# Patient Record
Sex: FEMALE | Race: WHITE | NOT HISPANIC OR LATINO | Employment: OTHER | URBAN - METROPOLITAN AREA
[De-identification: names, ages, dates, MRNs, and addresses within clinical notes are randomized per-mention and may not be internally consistent; named-entity substitution may affect disease eponyms.]

---

## 2018-07-19 PROBLEM — G62.9 NEUROPATHY: Status: ACTIVE | Noted: 2018-07-19

## 2019-04-11 PROBLEM — M41.9 SCOLIOSIS: Status: ACTIVE | Noted: 2019-04-11

## 2019-04-11 PROBLEM — F51.01 PRIMARY INSOMNIA: Status: ACTIVE | Noted: 2019-04-11

## 2019-04-11 PROBLEM — E78.5 HYPERLIPIDEMIA: Status: ACTIVE | Noted: 2019-04-11

## 2021-01-18 PROBLEM — Z12.11 SCREENING FOR COLON CANCER: Status: ACTIVE | Noted: 2021-01-18

## 2021-04-29 ENCOUNTER — PATIENT MESSAGE (OUTPATIENT)
Dept: RESEARCH | Facility: HOSPITAL | Age: 66
End: 2021-04-29

## 2021-06-30 ENCOUNTER — IMMUNIZATION (OUTPATIENT)
Dept: PHARMACY | Facility: CLINIC | Age: 66
End: 2021-06-30

## 2021-06-30 DIAGNOSIS — Z23 NEED FOR VACCINATION: Primary | ICD-10-CM

## 2021-11-05 PROBLEM — N39.3 STRESS INCONTINENCE: Status: ACTIVE | Noted: 2021-11-05

## 2021-12-29 ENCOUNTER — PATIENT MESSAGE (OUTPATIENT)
Dept: ADMINISTRATIVE | Facility: OTHER | Age: 66
End: 2021-12-29

## 2022-02-16 PROBLEM — I10 PRIMARY HYPERTENSION: Status: ACTIVE | Noted: 2022-02-16

## 2023-04-06 ENCOUNTER — OFFICE VISIT (OUTPATIENT)
Dept: PAIN MEDICINE | Facility: CLINIC | Age: 68
End: 2023-04-06
Payer: MEDICARE

## 2023-04-06 ENCOUNTER — PATIENT MESSAGE (OUTPATIENT)
Dept: PAIN MEDICINE | Facility: CLINIC | Age: 68
End: 2023-04-06

## 2023-04-06 ENCOUNTER — HOSPITAL ENCOUNTER (OUTPATIENT)
Dept: RADIOLOGY | Facility: HOSPITAL | Age: 68
Discharge: HOME | End: 2023-04-06
Attending: ANESTHESIOLOGY
Payer: MEDICARE

## 2023-04-06 VITALS
HEIGHT: 65 IN | SYSTOLIC BLOOD PRESSURE: 151 MMHG | WEIGHT: 174.38 LBS | DIASTOLIC BLOOD PRESSURE: 65 MMHG | BODY MASS INDEX: 29.05 KG/M2 | HEART RATE: 68 BPM

## 2023-04-06 DIAGNOSIS — M47.816 LUMBAR SPONDYLOSIS: ICD-10-CM

## 2023-04-06 DIAGNOSIS — M54.9 DORSALGIA: ICD-10-CM

## 2023-04-06 DIAGNOSIS — M47.816 LUMBAR SPONDYLOSIS: Primary | ICD-10-CM

## 2023-04-06 PROCEDURE — 99204 OFFICE O/P NEW MOD 45 MIN: CPT | Mod: S$PBB,,, | Performed by: ANESTHESIOLOGY

## 2023-04-06 PROCEDURE — 99999 PR PBB SHADOW E&M-EST. PATIENT-LVL V: ICD-10-PCS | Mod: PBBFAC,,, | Performed by: ANESTHESIOLOGY

## 2023-04-06 PROCEDURE — 99999 PR PBB SHADOW E&M-EST. PATIENT-LVL V: CPT | Mod: PBBFAC,,, | Performed by: ANESTHESIOLOGY

## 2023-04-06 PROCEDURE — 72114 X-RAY EXAM L-S SPINE BENDING: CPT | Mod: 26,,, | Performed by: RADIOLOGY

## 2023-04-06 PROCEDURE — 72114 XR LUMBAR SPINE 5 VIEW WITH FLEX AND EXT: ICD-10-PCS | Mod: 26,,, | Performed by: RADIOLOGY

## 2023-04-06 PROCEDURE — 72114 X-RAY EXAM L-S SPINE BENDING: CPT | Mod: TC,PO

## 2023-04-06 PROCEDURE — 99204 PR OFFICE/OUTPT VISIT, NEW, LEVL IV, 45-59 MIN: ICD-10-PCS | Mod: S$PBB,,, | Performed by: ANESTHESIOLOGY

## 2023-04-06 PROCEDURE — 99215 OFFICE O/P EST HI 40 MIN: CPT | Mod: PBBFAC,PN | Performed by: ANESTHESIOLOGY

## 2023-04-06 RX ORDER — SODIUM CHLORIDE, SODIUM LACTATE, POTASSIUM CHLORIDE, CALCIUM CHLORIDE 600; 310; 30; 20 MG/100ML; MG/100ML; MG/100ML; MG/100ML
INJECTION, SOLUTION INTRAVENOUS CONTINUOUS
Status: CANCELLED | OUTPATIENT
Start: 2023-04-06

## 2023-04-06 NOTE — PROGRESS NOTES
[facility] New Patient Evaluation      Referred by: Judah Self    PCP:     CC:   Chief Complaint   Patient presents with    Back Pain      No flowsheet data found.      HPI:   Linda Chauhan is a 67 y.o. female who presents with back pain.  Had chronic back pain for many years.  Today she rates her pain as 6/10, constant, aching, dull, burning, grabbing.  She denies any radicular pain, numbness, weakness.  Her pain is worse with standing, bending, lifting and relieved with rest, lying down.      Pain Intervention History:      Past Spine Surgical History:      Past and current medications:  Antineuropathics:  NSAIDs: naprosyn   Physical therapy: yes, has done chiropractic care  Antidepressants:  Muscle relaxers: flexeril  Opioids: hydrocodone  Antiplatelets/Anticoagulants: aspirin    History:    Current Outpatient Medications:     artificial tears ointment (ARTIFICIAL TEARS) Oint, Place 1 drop into both eyes daily as needed., Disp: , Rfl:     ascorbic acid, vitamin C, (VITAMIN C) 1000 MG tablet, Take 1,000 mg by mouth 2 (two) times daily. , Disp: , Rfl:     aspirin (ECOTRIN) 81 MG EC tablet, Take 1 tablet by mouth Daily., Disp: , Rfl:     CETIRIZINE HCL/PSEUDOEPHEDRINE (ZYRTEC-D ORAL), Take 1 tablet by mouth daily as needed., Disp: , Rfl:     cholecalciferol, vitamin D3, 2,000 unit Cap, Take 1 capsule by mouth Daily., Disp: , Rfl:     ELDERBERRY FRUIT ORAL, Take 1 tablet by mouth once daily., Disp: , Rfl:     EVENING PRIMROSE OIL ORAL, Take 1,000 mg by mouth Daily., Disp: , Rfl:     HYDROcodone-acetaminophen (NORCO) 7.5-325 mg per tablet, Take 1 tablet by mouth nightly as needed., Disp: 20 tablet, Rfl: 0    inulin (FIBER GUMMIES ORAL), Take by mouth once daily., Disp: , Rfl:     lisinopriL (PRINIVIL,ZESTRIL) 20 MG tablet, Take 1 tablet (20 mg total) by mouth once daily., Disp: 90 tablet, Rfl: 3    multivitamin (THERAGRAN) per tablet, Take 1 tablet by mouth once daily., Disp: , Rfl:     naproxen  sodium (ANAPROX) 220 MG tablet, Take 220 mg by mouth once daily. , Disp: , Rfl:     rosuvastatin (CRESTOR) 10 MG tablet, TAKE 1 TABLET EVERY EVENING, Disp: 90 tablet, Rfl: 3    solifenacin (VESICARE) 10 MG tablet, Take by mouth once daily., Disp: , Rfl:     vitamin C-biotin (HAIR-SKIN-NAILS, VIT C-BIOTIN,) 50 mg -1,250 mcg Chew, Take 1 tablet by mouth once daily., Disp: , Rfl:     zolpidem (AMBIEN) 10 mg Tab, TAKE 1 TABLET BY MOUTH EVERY NIGHT AS NEEDED, Disp: 30 tablet, Rfl: 3    ibandronate (BONIVA) 150 mg tablet, Take 1 tablet (150 mg total) by mouth every 30 days., Disp: 3 tablet, Rfl: 3  No current facility-administered medications for this visit.    Facility-Administered Medications Ordered in Other Visits:     diphenhydrAMINE injection 25 mg, 25 mg, Intravenous, Q6H PRN, Renny Gallego MD    HYDROmorphone injection 0.5 mg, 0.5 mg, Intravenous, Q5 Min PRN, Renny Gallego MD    lactated ringers infusion, , Intravenous, Continuous, Yared Pardo MD, Last Rate: 20 mL/hr at 11/05/21 1055, New Bag at 11/05/21 1055    lorazepam injection 0.25 mg, 0.25 mg, Intravenous, Once PRN, Renny Gallego MD    ondansetron injection 4 mg, 4 mg, Intravenous, Daily PRN, Renny Gallego MD    sodium chloride 0.9% bolus 250 mL, 250 mL, Intravenous, Once, Renny Gallego MD    Past Medical History:   Diagnosis Date    Anemia     Bunion     Disorder of breast     Fibrocystic breast disease     Foot pain     Fracture, foot     History of chicken pox     Hot flashes     Hyperlipidemia     Hypertension     Left wrist fracture     Osteomyelitis     right big toe-childhood    Sciatica     Scoliosis     lower back-pat reported    Unspecified adverse effect of other drug, medicinal and biological substance(995.29)     Weight gain        Past Surgical History:   Procedure Laterality Date    BREAST CYST ASPIRATION Left     done at Dr. Schmid office    BUNIONECTOMY Left 12/2007, 06/2008, 12/2008    CATARACT  "EXTRACTION Bilateral 06/03/2021    COLONOSCOPY N/A 1/18/2021    Procedure: COLONOSCOPY;  Surgeon: Ivan Mustafa MD;  Location: [facility] ENDO;  Service: Endoscopy;  Laterality: N/A;    INSERTION OF MIDURETHRAL SLING N/A 11/5/2021    Procedure: SLING, MIDURETHRAL;  Surgeon: Dmitry Weaver MD;  Location: [facility] OR;  Service: Urology;  Laterality: N/A;    KNEE SURGERY Right 03/2012    torn meniscus    ORIF FOOT FRACTURE Left     hardware     tummy tuck  10/08/2013       Family History   Problem Relation Age of Onset    Heart disease Father     Heart attack Father     Stroke Father     Hypertension Father     Dementia Father     Hyperlipidemia Father     Dementia Mother     Hyperlipidemia Mother     Breast cancer Sister 77    Breast cancer Maternal Aunt     Anesthesia problems Neg Hx     Malignant hypertension Neg Hx     Hypotension Neg Hx     Malignant hyperthermia Neg Hx     Pseudochol deficiency Neg Hx        Social History     Socioeconomic History    Marital status:      Spouse name: Fabian    Number of children: 3   Tobacco Use    Smoking status: Never    Smokeless tobacco: Never   Substance and Sexual Activity    Alcohol use: Yes     Comment: occasionally    Sexual activity: Yes     Partners: Male       Review of patient's allergies indicates:   Allergen Reactions    Atorvastatin      leg pain; muscle cramp    Simvastatin      muscle cramp      Sulfa (sulfonamide antibiotics)      Other reaction(s): pressure on chest, pressure on chest       Review of Systems:  Positive for back pain.  Balance of review of systems is negative.    Physical Exam:  Vitals:    04/06/23 0855   BP: (!) 151/65   Pulse: 68   Weight: 79.1 kg (174 lb 6.1 oz)   Height: 5' 5" (1.651 m)   PainSc:   5   PainLoc: Back     Body mass index is 29.02 kg/m².    Gen: NAD  Psych: mood appropriate for given condition  HEENT: eyes anicteric   CV: RRR, 2+ radial pulse  HEENT: anicteric   Respiratory: non-labored, no signs of respiratory " distress  Abd: non-distended  Skin: warm, dry and intact.  Gait: No antalgic gait.     Pain with lumbar axial facet loading    Sensory:  Intact and symmetrical to light touch in L1-S1 dermatomes bilaterally.    Motor:     Right Left   L2/3 Iliacus Hip flexion  5  5   L3/4 Qudratus Femoris Knee Extension  5  5   L4/5 Tib Anterior Ankle Dorsiflexion   5  5   L5/S1 Extensor Hallicus Longus Great toe extension  5  5   S1/S2 Gastroc/Soleus Plantar Flexion  5  5      Right Left                  Patellar DTR 2+ 2+   Achilles DTR 2+ 2+                      Imaging:  Xray lumbar spine 4/6/23  FINDINGS:  The bones appear osteopenic.  There is a mild broad dextrocurvature of the mid lumbar spine which has slightly progressed.  Additionally, there has been interval progression of degenerative change.  There is now marked disc space narrowing towards the right at the L1-2 level.  There is also marked disc space narrowing worse towards the left at the L3-4 level.  This has progressed at both of these levels.  There is marked disc space narrowing at the L4-5 level.  This was present previously.  There is multilevel facet joint arthropathy.  There is mild retrolisthesis of L1 on L2 with trace retrolisthesis of L2 on L3 and L3 on L4.  There is stable mild anterolisthesis of L4 on L5.  No instability is demonstrated.    Labs:  Lab Results   Component Value Date    HGBA1C 5.4 11/13/2019       Lab Results   Component Value Date    WBC 3.78 (L) 03/20/2023    HGB 13.5 03/20/2023    HCT 42.1 03/20/2023    MCV 88 03/20/2023     03/20/2023           Assessment:   Problem List Items Addressed This Visit    None  Visit Diagnoses       Lumbar spondylosis    -  Primary    Relevant Orders    X-Ray Lumbar Complete Including Flex And Ext    Dorsalgia                  67 y.o. year old female with PMH hyperlipidemia, HTN who presents with back pain.  Had chronic back pain for many years.  Today she rates her pain as 6/10, constant, aching,  dull, burning, grabbing.  She denies any radicular pain, numbness, weakness.  Her pain is worse with standing, bending, lifting and relieved with rest, lying down.    - on exam she is neurologically intact.  She has reproducible pain with lumbar axial facet loading  - x-ray lumbar spine consistent with multilevel bilateral facet arthropathy  - over the past 6 months she has been participating in chiropractic care as well as using medications such as NSAIDs, Flexeril, hydrocodone however she continues to have axial lower back pain that is limiting her mobility and interfering with her daily living.  Some of her daily living activities that are limited include playing and caring for her grandchildren  - we will schedule for 1st diagnostic bilateral L4-5 and L5-S1 medial branch blocks.  The risks and benefits of this intervention, and alternative therapies were discussed with the patient.  The discussion of risks included infection, bleeding, need for additional procedures or surgery, nerve damage.  Questions regarding the procedure, risks, expected outcome, and possible side effects were solicited and answered to the patient's satisfaction.  Linda Chauhan wishes to proceed with the injection or procedure.  Written consent was obtained.  - if she fails to get relief with these then we will get a lumbar MRI      : Not applicable    Ken Calixto M.D.  Interventional Pain Medicine / Anesthesiology    This note was completed with dictation software and grammatical errors may exist.

## 2023-04-06 NOTE — H&P (VIEW-ONLY)
[facility] New Patient Evaluation      Referred by: Judah Self    PCP:     CC:   Chief Complaint   Patient presents with    Back Pain      No flowsheet data found.      HPI:   Linda Chauhan is a 67 y.o. female who presents with back pain.  Had chronic back pain for many years.  Today she rates her pain as 6/10, constant, aching, dull, burning, grabbing.  She denies any radicular pain, numbness, weakness.  Her pain is worse with standing, bending, lifting and relieved with rest, lying down.      Pain Intervention History:      Past Spine Surgical History:      Past and current medications:  Antineuropathics:  NSAIDs: naprosyn   Physical therapy: yes, has done chiropractic care  Antidepressants:  Muscle relaxers: flexeril  Opioids: hydrocodone  Antiplatelets/Anticoagulants: aspirin    History:    Current Outpatient Medications:     artificial tears ointment (ARTIFICIAL TEARS) Oint, Place 1 drop into both eyes daily as needed., Disp: , Rfl:     ascorbic acid, vitamin C, (VITAMIN C) 1000 MG tablet, Take 1,000 mg by mouth 2 (two) times daily. , Disp: , Rfl:     aspirin (ECOTRIN) 81 MG EC tablet, Take 1 tablet by mouth Daily., Disp: , Rfl:     CETIRIZINE HCL/PSEUDOEPHEDRINE (ZYRTEC-D ORAL), Take 1 tablet by mouth daily as needed., Disp: , Rfl:     cholecalciferol, vitamin D3, 2,000 unit Cap, Take 1 capsule by mouth Daily., Disp: , Rfl:     ELDERBERRY FRUIT ORAL, Take 1 tablet by mouth once daily., Disp: , Rfl:     EVENING PRIMROSE OIL ORAL, Take 1,000 mg by mouth Daily., Disp: , Rfl:     HYDROcodone-acetaminophen (NORCO) 7.5-325 mg per tablet, Take 1 tablet by mouth nightly as needed., Disp: 20 tablet, Rfl: 0    inulin (FIBER GUMMIES ORAL), Take by mouth once daily., Disp: , Rfl:     lisinopriL (PRINIVIL,ZESTRIL) 20 MG tablet, Take 1 tablet (20 mg total) by mouth once daily., Disp: 90 tablet, Rfl: 3    multivitamin (THERAGRAN) per tablet, Take 1 tablet by mouth once daily., Disp: , Rfl:     naproxen  sodium (ANAPROX) 220 MG tablet, Take 220 mg by mouth once daily. , Disp: , Rfl:     rosuvastatin (CRESTOR) 10 MG tablet, TAKE 1 TABLET EVERY EVENING, Disp: 90 tablet, Rfl: 3    solifenacin (VESICARE) 10 MG tablet, Take by mouth once daily., Disp: , Rfl:     vitamin C-biotin (HAIR-SKIN-NAILS, VIT C-BIOTIN,) 50 mg -1,250 mcg Chew, Take 1 tablet by mouth once daily., Disp: , Rfl:     zolpidem (AMBIEN) 10 mg Tab, TAKE 1 TABLET BY MOUTH EVERY NIGHT AS NEEDED, Disp: 30 tablet, Rfl: 3    ibandronate (BONIVA) 150 mg tablet, Take 1 tablet (150 mg total) by mouth every 30 days., Disp: 3 tablet, Rfl: 3  No current facility-administered medications for this visit.    Facility-Administered Medications Ordered in Other Visits:     diphenhydrAMINE injection 25 mg, 25 mg, Intravenous, Q6H PRN, Renny Gallego MD    HYDROmorphone injection 0.5 mg, 0.5 mg, Intravenous, Q5 Min PRN, Renny Gallego MD    lactated ringers infusion, , Intravenous, Continuous, Yared Pardo MD, Last Rate: 20 mL/hr at 11/05/21 1055, New Bag at 11/05/21 1055    lorazepam injection 0.25 mg, 0.25 mg, Intravenous, Once PRN, Renny Gallego MD    ondansetron injection 4 mg, 4 mg, Intravenous, Daily PRN, Renny Gallego MD    sodium chloride 0.9% bolus 250 mL, 250 mL, Intravenous, Once, Renny Gallego MD    Past Medical History:   Diagnosis Date    Anemia     Bunion     Disorder of breast     Fibrocystic breast disease     Foot pain     Fracture, foot     History of chicken pox     Hot flashes     Hyperlipidemia     Hypertension     Left wrist fracture     Osteomyelitis     right big toe-childhood    Sciatica     Scoliosis     lower back-pat reported    Unspecified adverse effect of other drug, medicinal and biological substance(995.29)     Weight gain        Past Surgical History:   Procedure Laterality Date    BREAST CYST ASPIRATION Left     done at Dr. Schmid office    BUNIONECTOMY Left 12/2007, 06/2008, 12/2008    CATARACT  "EXTRACTION Bilateral 06/03/2021    COLONOSCOPY N/A 1/18/2021    Procedure: COLONOSCOPY;  Surgeon: Ivan Mustafa MD;  Location: [facility] ENDO;  Service: Endoscopy;  Laterality: N/A;    INSERTION OF MIDURETHRAL SLING N/A 11/5/2021    Procedure: SLING, MIDURETHRAL;  Surgeon: Dmitry Weaver MD;  Location: [facility] OR;  Service: Urology;  Laterality: N/A;    KNEE SURGERY Right 03/2012    torn meniscus    ORIF FOOT FRACTURE Left     hardware     tummy tuck  10/08/2013       Family History   Problem Relation Age of Onset    Heart disease Father     Heart attack Father     Stroke Father     Hypertension Father     Dementia Father     Hyperlipidemia Father     Dementia Mother     Hyperlipidemia Mother     Breast cancer Sister 77    Breast cancer Maternal Aunt     Anesthesia problems Neg Hx     Malignant hypertension Neg Hx     Hypotension Neg Hx     Malignant hyperthermia Neg Hx     Pseudochol deficiency Neg Hx        Social History     Socioeconomic History    Marital status:      Spouse name: Fabian    Number of children: 3   Tobacco Use    Smoking status: Never    Smokeless tobacco: Never   Substance and Sexual Activity    Alcohol use: Yes     Comment: occasionally    Sexual activity: Yes     Partners: Male       Review of patient's allergies indicates:   Allergen Reactions    Atorvastatin      leg pain; muscle cramp    Simvastatin      muscle cramp      Sulfa (sulfonamide antibiotics)      Other reaction(s): pressure on chest, pressure on chest       Review of Systems:  Positive for back pain.  Balance of review of systems is negative.    Physical Exam:  Vitals:    04/06/23 0855   BP: (!) 151/65   Pulse: 68   Weight: 79.1 kg (174 lb 6.1 oz)   Height: 5' 5" (1.651 m)   PainSc:   5   PainLoc: Back     Body mass index is 29.02 kg/m².    Gen: NAD  Psych: mood appropriate for given condition  HEENT: eyes anicteric   CV: RRR, 2+ radial pulse  HEENT: anicteric   Respiratory: non-labored, no signs of respiratory " distress  Abd: non-distended  Skin: warm, dry and intact.  Gait: No antalgic gait.     Pain with lumbar axial facet loading    Sensory:  Intact and symmetrical to light touch in L1-S1 dermatomes bilaterally.    Motor:     Right Left   L2/3 Iliacus Hip flexion  5  5   L3/4 Qudratus Femoris Knee Extension  5  5   L4/5 Tib Anterior Ankle Dorsiflexion   5  5   L5/S1 Extensor Hallicus Longus Great toe extension  5  5   S1/S2 Gastroc/Soleus Plantar Flexion  5  5      Right Left                  Patellar DTR 2+ 2+   Achilles DTR 2+ 2+                      Imaging:  Xray lumbar spine 4/6/23  FINDINGS:  The bones appear osteopenic.  There is a mild broad dextrocurvature of the mid lumbar spine which has slightly progressed.  Additionally, there has been interval progression of degenerative change.  There is now marked disc space narrowing towards the right at the L1-2 level.  There is also marked disc space narrowing worse towards the left at the L3-4 level.  This has progressed at both of these levels.  There is marked disc space narrowing at the L4-5 level.  This was present previously.  There is multilevel facet joint arthropathy.  There is mild retrolisthesis of L1 on L2 with trace retrolisthesis of L2 on L3 and L3 on L4.  There is stable mild anterolisthesis of L4 on L5.  No instability is demonstrated.    Labs:  Lab Results   Component Value Date    HGBA1C 5.4 11/13/2019       Lab Results   Component Value Date    WBC 3.78 (L) 03/20/2023    HGB 13.5 03/20/2023    HCT 42.1 03/20/2023    MCV 88 03/20/2023     03/20/2023           Assessment:   Problem List Items Addressed This Visit    None  Visit Diagnoses       Lumbar spondylosis    -  Primary    Relevant Orders    X-Ray Lumbar Complete Including Flex And Ext    Dorsalgia                  67 y.o. year old female with PMH hyperlipidemia, HTN who presents with back pain.  Had chronic back pain for many years.  Today she rates her pain as 6/10, constant, aching,  dull, burning, grabbing.  She denies any radicular pain, numbness, weakness.  Her pain is worse with standing, bending, lifting and relieved with rest, lying down.    - on exam she is neurologically intact.  She has reproducible pain with lumbar axial facet loading  - x-ray lumbar spine consistent with multilevel bilateral facet arthropathy  - over the past 6 months she has been participating in chiropractic care as well as using medications such as NSAIDs, Flexeril, hydrocodone however she continues to have axial lower back pain that is limiting her mobility and interfering with her daily living.  Some of her daily living activities that are limited include playing and caring for her grandchildren  - we will schedule for 1st diagnostic bilateral L4-5 and L5-S1 medial branch blocks.  The risks and benefits of this intervention, and alternative therapies were discussed with the patient.  The discussion of risks included infection, bleeding, need for additional procedures or surgery, nerve damage.  Questions regarding the procedure, risks, expected outcome, and possible side effects were solicited and answered to the patient's satisfaction.  Linda Chauhan wishes to proceed with the injection or procedure.  Written consent was obtained.  - if she fails to get relief with these then we will get a lumbar MRI      : Not applicable    Ken Calixto M.D.  Interventional Pain Medicine / Anesthesiology    This note was completed with dictation software and grammatical errors may exist.

## 2023-04-17 ENCOUNTER — HOSPITAL ENCOUNTER (OUTPATIENT)
Dept: RADIOLOGY | Facility: HOSPITAL | Age: 68
Discharge: HOME | End: 2023-04-17
Attending: ANESTHESIOLOGY
Payer: MEDICARE

## 2023-04-17 ENCOUNTER — HOSPITAL ENCOUNTER (OUTPATIENT)
Facility: HOSPITAL | Age: 68
Discharge: HOME | End: 2023-04-17
Attending: ANESTHESIOLOGY | Admitting: ANESTHESIOLOGY
Payer: MEDICARE

## 2023-04-17 VITALS
RESPIRATION RATE: 18 BRPM | OXYGEN SATURATION: 96 % | TEMPERATURE: 98 F | WEIGHT: 174 LBS | SYSTOLIC BLOOD PRESSURE: 184 MMHG | DIASTOLIC BLOOD PRESSURE: 81 MMHG | HEART RATE: 65 BPM | BODY MASS INDEX: 28.99 KG/M2 | HEIGHT: 65 IN

## 2023-04-17 DIAGNOSIS — M54.50 LOWER BACK PAIN: ICD-10-CM

## 2023-04-17 DIAGNOSIS — M47.816 LUMBAR SPONDYLOSIS: Primary | ICD-10-CM

## 2023-04-17 PROCEDURE — 64493 INJ PARAVERT F JNT L/S 1 LEV: CPT | Mod: 50,PO | Performed by: ANESTHESIOLOGY

## 2023-04-17 PROCEDURE — 64493 PR INJ DX/THER AGNT PARAVERT FACET JOINT,IMG GUIDE,LUMBAR/SAC,1ST LVL: ICD-10-PCS | Mod: 50,,, | Performed by: ANESTHESIOLOGY

## 2023-04-17 PROCEDURE — 25000003 PHARM REV CODE 250: Mod: PO | Performed by: ANESTHESIOLOGY

## 2023-04-17 PROCEDURE — 64494 INJ PARAVERT F JNT L/S 2 LEV: CPT | Mod: 50,,, | Performed by: ANESTHESIOLOGY

## 2023-04-17 PROCEDURE — 76000 FLUOROSCOPY <1 HR PHYS/QHP: CPT | Mod: TC,PO

## 2023-04-17 PROCEDURE — 63600175 PHARM REV CODE 636 W HCPCS: Mod: PO | Performed by: ANESTHESIOLOGY

## 2023-04-17 PROCEDURE — 64494 PR INJ DX/THER AGNT PARAVERT FACET JOINT,IMG GUIDE,LUMBAR/SAC, 2ND LEVEL: ICD-10-PCS | Mod: 50,,, | Performed by: ANESTHESIOLOGY

## 2023-04-17 PROCEDURE — 64493 INJ PARAVERT F JNT L/S 1 LEV: CPT | Mod: 50,,, | Performed by: ANESTHESIOLOGY

## 2023-04-17 PROCEDURE — 64494 INJ PARAVERT F JNT L/S 2 LEV: CPT | Mod: 50,PO | Performed by: ANESTHESIOLOGY

## 2023-04-17 RX ORDER — MIDAZOLAM HYDROCHLORIDE 2 MG/2ML
INJECTION, SOLUTION INTRAMUSCULAR; INTRAVENOUS
Status: DISCONTINUED | OUTPATIENT
Start: 2023-04-17 | End: 2023-04-17 | Stop reason: HOSPADM

## 2023-04-17 RX ORDER — SODIUM CHLORIDE, SODIUM LACTATE, POTASSIUM CHLORIDE, CALCIUM CHLORIDE 600; 310; 30; 20 MG/100ML; MG/100ML; MG/100ML; MG/100ML
INJECTION, SOLUTION INTRAVENOUS CONTINUOUS
Status: DISCONTINUED | OUTPATIENT
Start: 2023-04-17 | End: 2023-04-17 | Stop reason: HOSPADM

## 2023-04-17 RX ORDER — BUPIVACAINE HYDROCHLORIDE 2.5 MG/ML
INJECTION, SOLUTION EPIDURAL; INFILTRATION; INTRACAUDAL
Status: DISCONTINUED | OUTPATIENT
Start: 2023-04-17 | End: 2023-04-17 | Stop reason: HOSPADM

## 2023-04-17 RX ORDER — LIDOCAINE HYDROCHLORIDE 10 MG/ML
INJECTION, SOLUTION EPIDURAL; INFILTRATION; INTRACAUDAL; PERINEURAL
Status: DISCONTINUED | OUTPATIENT
Start: 2023-04-17 | End: 2023-04-17 | Stop reason: HOSPADM

## 2023-04-17 RX ADMIN — SODIUM CHLORIDE, POTASSIUM CHLORIDE, SODIUM LACTATE AND CALCIUM CHLORIDE: 600; 310; 30; 20 INJECTION, SOLUTION INTRAVENOUS at 09:04

## 2023-04-17 NOTE — DISCHARGE SUMMARY
[facility]  Discharge Note  Short Stay    Admit Date: 4/17/2023    Discharge Date: 4/17/2023    Attending Physician: Ken Calixto     Discharge Provider: Ken Calixto    Diagnoses:  There are no hospital problems to display for this patient.      Discharged Condition: Good    Final Diagnoses: Lumbar spondylosis [M47.816]    Disposition: Home or Self Care    Hospital Course: No complications, uneventful    Outcome of Hospitalization, Treatment, Procedure, or Surgery:  Patient was admitted for outpatient interventional pain management procedure. The patient tolerated the procedure well with no complications.    Follow up/Patient Instructions:  Follow up as scheduled in Pain Management office in 2-3 weeks.  Patient has received instructions and follow up date and time.    Medications:  Continue previous medications    Discharge Procedure Orders   Notify your health care provider if you experience any of the following:  temperature >100.4     Notify your health care provider if you experience any of the following:  persistent nausea and vomiting or diarrhea     Notify your health care provider if you experience any of the following:  severe uncontrolled pain     Notify your health care provider if you experience any of the following:  redness, tenderness, or signs of infection (pain, swelling, redness, odor or green/yellow discharge around incision site)     Notify your health care provider if you experience any of the following:  difficulty breathing or increased cough     Notify your health care provider if you experience any of the following:  severe persistent headache     Notify your health care provider if you experience any of the following:  worsening rash     Notify your health care provider if you experience any of the following:  persistent dizziness, light-headedness, or visual disturbances     Notify your health care provider if you experience any of the following:  increased confusion or  weakness     Activity as tolerated

## 2023-04-17 NOTE — OP NOTE
Procedure Note    Procedure Date: 4/17/2023    Procedure Performed:  bilateral Diagnostic Lumbar Medial Branch @ L4/5 and L5/S1, with Fluoroscopic Guidance    Indications:   Linda Chauhan has chronic, moderate to severe axial lower back pain present for over the past 3 months that has failed to respond to physical therapy and PT-directed home exercises. There is no untreated radiculopathy or claudication present.  The patient has clinical and radiologic findings suggestive of facet mediated pain.     Pre-op diagnosis: Lumbar Spondylosis    Post-op diagnosis: same    Physician: Ken Calixto MD    IV Sedation medications: Moderate sedation was achieved with midazolam 2 mg   Continuous monitoring of EKG, blood pressure and pulse oximetry was provided by a registered nurse during the entire course of the procedure under my supervision and recorded in the patient's medical record.   Total time for sedation was 14 minutes.    Medications injected: 8 ml Bupivacaine 0.25%    Local anesthetic used: 1% Lidocaine, 5 ml    Estimated Blood Loss: none    Complications:  none    Technique:  The patient was interviewed in the holding area and Risks/Benefits were discussed, including, but not limited to, the possibility of new or different pain, bleeding or infection.   All questions were answered.  The patient understood and accepted risks.  Consent was reviewed.  A time out was taken to identify the patient, procedure and side of the procedure. The patient was placed in a prone position, then prepped and draped in the usual sterile fashion using ChloraPrep x2 and sterile towels.  The levels were determined under fluoroscopic guidance and then marked.  1% Lidocaine was given by raising a wheal at the skin over each site and then infiltrated approximately 2cm deeper.  A 25-gauge 3.5 inch needle was introduced to the anatomic location of the L3-5 medial branch nerves on the bilateral side.  Then, after negative aspiration, 1.5 cc  of 8 ml Bupivacaine 0.25% was injected @ each level.  The patient tolerated the procedure well.  The patient tolerated the procedure well and was transferred to the [facility] in stable condition.  The patient was monitored after the procedure.  Patient was given post procedure and discharge instructions to follow at home.  The patient was discharged in a stable condition.    Event Time In   In Facility 0836   In Pre-Procedure 0925   Physician Available    Anesthesia Available    Pre-Op: Bedside Procedure Start    Pre-Op: Bedside Procedure Stop    Pre-Procedure Complete 0944   Out of Pre-Procedure    Anesthesia Start    Anesthesia Start Data Collection    Setup Start    Setup Complete    In Room 1018   Prep Start    Procedure Prep Complete    Procedure Start 1023   Procedure Closing    Emergence    Procedure Finish 1029   Sedation Start 1017   Scope In    Extent Reached    Scope Out    Sedation End 1031   Out of Room 1031   Cleanup Start    Cleanup Complete    Cosmetic Start    Cosmetic Stop    Pain Mgmt In Room    Pain Mgmt Out Room    In Recovery    Anesthesia Finish    Bedside Procedure Start    Bedside Procedure Stop    Recovery Care Complete    Out of Recovery    To Phase II    In Phase II    Pain Mgmt Recovery Start 1031   Pain Mgmt Recovery Stop    Obs Rec Start    Obs Rec Stop    Phase II Care Complete    Out of Phase II    Procedural Care Complete    Discharge    Pain Follow Up Needed    Pain Follow Up Complete

## 2023-04-19 ENCOUNTER — TELEPHONE (OUTPATIENT)
Dept: PAIN MEDICINE | Facility: CLINIC | Age: 68
End: 2023-04-19
Payer: MEDICARE

## 2023-04-19 NOTE — TELEPHONE ENCOUNTER
1. What percentage of pain relief did you receive following the block, from 0-100%? What was pain score from 0-10?    Patient reports 80% relief     2. How many hours did pain relief last following the block?    [id number]    3. During this time please describe in detail the activities you were able to do?  Babysat her 4month grandchild and was able to carry her comfortably and for longer periods of time.    Advised patient that she would receive call from clinic about scheduling future procedure. Patient voiced understanding.

## 2024-03-05 PROBLEM — I70.0 AORTIC ATHEROSCLEROSIS: Status: ACTIVE | Noted: 2024-03-05

## (undated) DEVICE — GLOVE 7.5 PROTEXIS PI MICRO

## (undated) DEVICE — NDL SPINAL 25GX3.5 SPINOCAN

## (undated) DEVICE — APPLICATOR CHLORAPREP CLR 10.5

## (undated) DEVICE — TRAY NERVE BLOCK

## (undated) DEVICE — TOWEL OR DISP STRL BLUE 4/PK